# Patient Record
Sex: FEMALE | Race: WHITE | URBAN - METROPOLITAN AREA
[De-identification: names, ages, dates, MRNs, and addresses within clinical notes are randomized per-mention and may not be internally consistent; named-entity substitution may affect disease eponyms.]

---

## 2020-10-12 ENCOUNTER — OFFICE VISIT (OUTPATIENT)
Dept: URBAN - METROPOLITAN AREA CLINIC 12 | Facility: CLINIC | Age: 80
End: 2020-10-12
Payer: MEDICARE

## 2020-10-12 DIAGNOSIS — K59.09 CHRONIC CONSTIPATION: ICD-10-CM

## 2020-10-12 PROCEDURE — 1036F TOBACCO NON-USER: CPT | Performed by: INTERNAL MEDICINE

## 2020-10-12 PROCEDURE — 3017F COLORECTAL CA SCREEN DOC REV: CPT | Performed by: INTERNAL MEDICINE

## 2020-10-12 PROCEDURE — G8427 DOCREV CUR MEDS BY ELIG CLIN: HCPCS | Performed by: INTERNAL MEDICINE

## 2020-10-12 PROCEDURE — 99213 OFFICE O/P EST LOW 20 MIN: CPT | Performed by: INTERNAL MEDICINE

## 2020-10-12 PROCEDURE — G9903 PT SCRN TBCO ID AS NON USER: HCPCS | Performed by: INTERNAL MEDICINE

## 2020-10-12 PROCEDURE — G8420 CALC BMI NORM PARAMETERS: HCPCS | Performed by: INTERNAL MEDICINE

## 2020-10-12 RX ORDER — METOPROLOL TARTRATE 25 MG/1
TAKE 1 TABLET (25 MG) BY ORAL ROUTE ONCE DAILY TABLET, FILM COATED ORAL 1
Qty: 0 | Refills: 0 | Status: DISCONTINUED | COMMUNITY
Start: 1900-01-01

## 2020-10-12 RX ORDER — TRAVOPROST 0.04 MG/ML
INSTILL 1 DROP INTO AFFECTED EYE(S) BY OPHTHALMIC ROUTE ONCE DAILY IN THE EVENING SOLUTION/ DROPS OPHTHALMIC 1
Qty: 1 | Refills: 0 | Status: ACTIVE | COMMUNITY
Start: 1900-01-01

## 2020-10-12 RX ORDER — CLOPIDOGREL 75 MG/1
1 TABLET TABLET, FILM COATED ORAL ONCE A DAY
Status: ACTIVE | COMMUNITY

## 2020-10-12 RX ORDER — METOPROLOL TARTRATE 100 MG/1
1 TABLET WITH FOOD TABLET, FILM COATED ORAL TWICE A DAY
Status: ACTIVE | COMMUNITY

## 2020-10-12 RX ORDER — DONEPEZIL HYDROCHLORIDE 10 MG/1
1 TABLET AT BEDTIME TABLET, FILM COATED ORAL ONCE A DAY
Status: ACTIVE | COMMUNITY

## 2020-10-12 RX ORDER — AMLODIPINE BESYLATE 5 MG/1
1 TABLET TABLET ORAL ONCE A DAY
Status: ACTIVE | COMMUNITY

## 2020-10-12 RX ORDER — ATORVASTATIN CALCIUM 20 MG/1
1 TABLET TABLET, FILM COATED ORAL ONCE A DAY
Status: ACTIVE | COMMUNITY

## 2020-10-12 RX ORDER — APIXABAN 5 MG/1
TAKE 1 TABLET (5 MG) BY ORAL ROUTE 2 TIMES PER DAY TABLET, FILM COATED ORAL 2
Qty: 0 | Refills: 0 | Status: ACTIVE | COMMUNITY
Start: 1900-01-01

## 2020-10-12 RX ORDER — MEMANTINE HYDROCHLORIDE 10 MG/1
1 TABLET TABLET ORAL TWICE A DAY
Status: ACTIVE | COMMUNITY

## 2020-10-12 NOTE — HPI-TODAY'S VISIT:
This is an 80-year-old woman who presents for evaluation.  She has been seeing Dr. Mitchell Rosas in our group previously for chronic constipation.  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 Mitchell Rosas on October 22, 2018.  She is present with her  Juan today.  Patient has a history of progressive dementia and sees a neurologist at the [facility].  Patient does see her primary care physician there regularly .  Patient has had a problem with constipation now for the past 2 years or so at least.  She describes having bowel movements perhaps every 3 to 4 days and often firm and has to strain.  However she has not taken regular laxatives.  Just last week she started taking MiraLAX for most of the nights of the week and noted that her bowel movements had then become more regular.  There is been no recent bleeding no abdominal pain no nausea or vomiting no unintentional weight loss no appetite problems.  No chest pain or shortness of breath.  She is on a high-fiber diet and does like prunes.  However she has noted no increased improvement with just high-fiber diet alone.

## 2020-12-15 ENCOUNTER — OFFICE VISIT (OUTPATIENT)
Dept: URBAN - METROPOLITAN AREA CLINIC 12 | Facility: CLINIC | Age: 80
End: 2020-12-15

## 2023-07-27 ENCOUNTER — DASHBOARD ENCOUNTERS (OUTPATIENT)
Age: 83
End: 2023-07-27

## 2023-07-27 ENCOUNTER — TELEPHONE ENCOUNTER (OUTPATIENT)
Dept: URBAN - METROPOLITAN AREA CLINIC 35 | Facility: CLINIC | Age: 83
End: 2023-07-27

## 2023-07-27 ENCOUNTER — OFFICE VISIT (OUTPATIENT)
Dept: URBAN - METROPOLITAN AREA CLINIC 35 | Facility: CLINIC | Age: 83
End: 2023-07-27
Payer: MEDICARE

## 2023-07-27 VITALS
WEIGHT: 155 LBS | HEIGHT: 67 IN | BODY MASS INDEX: 24.33 KG/M2 | DIASTOLIC BLOOD PRESSURE: 78 MMHG | SYSTOLIC BLOOD PRESSURE: 122 MMHG

## 2023-07-27 DIAGNOSIS — R19.5 HEME + STOOL: ICD-10-CM

## 2023-07-27 DIAGNOSIS — K59.1 FUNCTIONAL DIARRHEA: ICD-10-CM

## 2023-07-27 DIAGNOSIS — K62.5 RECTAL BLEEDING: ICD-10-CM

## 2023-07-27 PROCEDURE — 99214 OFFICE O/P EST MOD 30 MIN: CPT | Performed by: PHYSICIAN ASSISTANT

## 2023-07-27 RX ORDER — CLOPIDOGREL 75 MG/1
1 TABLET TABLET, FILM COATED ORAL ONCE A DAY
Status: ACTIVE | COMMUNITY

## 2023-07-27 RX ORDER — DONEPEZIL HYDROCHLORIDE 10 MG/1
1 TABLET AT BEDTIME TABLET, FILM COATED ORAL ONCE A DAY
Status: ACTIVE | COMMUNITY

## 2023-07-27 RX ORDER — METOPROLOL SUCCINATE 25 MG/1
TAKE ONE-HALF TABLET BY MOUTH AT BEDTIME TABLET, EXTENDED RELEASE ORAL
Qty: 45 UNSPECIFIED | Refills: 3 | Status: ACTIVE | COMMUNITY

## 2023-07-27 RX ORDER — QUETIAPINE FUMARATE 25 MG/1
TAKE ONE-HALF TABLET BY MOUTH EVERY NIGHT FOR 2 WEEKS . IF TOLERATING AND NEEDED, CAN INCREASE TO 1 TABLET NIGHTLY TABLET ORAL
Qty: 30 UNSPECIFIED | Refills: 3 | Status: ACTIVE | COMMUNITY

## 2023-07-27 RX ORDER — LORATADINE 10 MG/1
TAKE ONE TABLET BY MOUTH EVERY EVENING FOR NASAL DRAINAGE AND DRY COUGH TABLET ORAL
Qty: 90 UNSPECIFIED | Refills: 3 | Status: ON HOLD | COMMUNITY

## 2023-07-27 RX ORDER — MEMANTINE HYDROCHLORIDE 10 MG/1
1 TABLET TABLET ORAL TWICE A DAY
Status: ACTIVE | COMMUNITY

## 2023-07-27 RX ORDER — METOPROLOL TARTRATE 100 MG/1
1 TABLET WITH FOOD TABLET, FILM COATED ORAL TWICE A DAY
Status: ACTIVE | COMMUNITY

## 2023-07-27 RX ORDER — APIXABAN 5 MG/1
TAKE 1 TABLET (5 MG) BY ORAL ROUTE 2 TIMES PER DAY TABLET, FILM COATED ORAL 2
Qty: 0 | Refills: 0 | Status: ACTIVE | COMMUNITY
Start: 1900-01-01

## 2023-07-27 RX ORDER — ATORVASTATIN CALCIUM 20 MG/1
1 TABLET TABLET, FILM COATED ORAL ONCE A DAY
Status: ACTIVE | COMMUNITY

## 2023-07-27 RX ORDER — TRAVOPROST OPHTHALMIC SOLUTION 0.04 MG/ML
INSTILL ONE DROP INTO EACH EYE ONE TIME DAILY AT NIGHT SOLUTION OPHTHALMIC
Qty: 7.5 UNSPECIFIED | Refills: 1 | Status: ACTIVE | COMMUNITY

## 2023-07-27 RX ORDER — SERTRALINE 100 MG/1
TAKE ONE TABLET BY MOUTH EVERY MORNING TABLET, FILM COATED ORAL
Qty: 90 UNSPECIFIED | Refills: 2 | Status: ACTIVE | COMMUNITY

## 2023-07-27 RX ORDER — TRAVOPROST 0.04 MG/ML
INSTILL 1 DROP INTO AFFECTED EYE(S) BY OPHTHALMIC ROUTE ONCE DAILY IN THE EVENING SOLUTION/ DROPS OPHTHALMIC 1
Qty: 1 | Refills: 0 | Status: ACTIVE | COMMUNITY
Start: 1900-01-01

## 2023-07-27 RX ORDER — AMLODIPINE BESYLATE 5 MG/1
1 TABLET TABLET ORAL ONCE A DAY
Status: ACTIVE | COMMUNITY

## 2023-07-27 NOTE — HPI-RECTAL BLEEDING
83 year old female patient presents today for a consultation about rectal bleeding. Bleeding started about 2-3 months ago and happens intermittently. The blood is bright red in color and is present on tissue. Patient currently admits 2-3 bowel movements per day, without strain. Her stools are either loose or formed. She denies any mucus or melena in stools. She denies any rectal pain or pruritus ani. Denies any associated abdominal pain or cramping. Admits she had a positive FOBT about a month ago. Prior colonoscopy 2018 with angiodyplastic lesions notes in cecum.  No rectal pain.